# Patient Record
Sex: FEMALE | Race: WHITE | Employment: UNEMPLOYED | URBAN - METROPOLITAN AREA
[De-identification: names, ages, dates, MRNs, and addresses within clinical notes are randomized per-mention and may not be internally consistent; named-entity substitution may affect disease eponyms.]

---
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
## 2024-02-24 ENCOUNTER — APPOINTMENT (OUTPATIENT)
Dept: ULTRASOUND IMAGING | Age: 24
End: 2024-02-24
Payer: MEDICAID

## 2024-02-24 ENCOUNTER — HOSPITAL ENCOUNTER (EMERGENCY)
Age: 24
Discharge: HOME | End: 2024-02-24
Attending: EMERGENCY MEDICINE
Payer: MEDICAID

## 2024-02-24 ENCOUNTER — APPOINTMENT (OUTPATIENT)
Dept: GENERAL RADIOLOGY | Age: 24
End: 2024-02-24
Payer: MEDICAID

## 2024-02-24 VITALS
HEIGHT: 63 IN | DIASTOLIC BLOOD PRESSURE: 60 MMHG | WEIGHT: 148.59 LBS | BODY MASS INDEX: 26.33 KG/M2 | HEART RATE: 82 BPM | OXYGEN SATURATION: 98 % | SYSTOLIC BLOOD PRESSURE: 96 MMHG | TEMPERATURE: 98.4 F | RESPIRATION RATE: 20 BRPM

## 2024-02-24 DIAGNOSIS — B96.89 BACTERIAL VAGINOSIS IN PREGNANCY: ICD-10-CM

## 2024-02-24 DIAGNOSIS — O36.80X0 PREGNANCY OF UNKNOWN ANATOMIC LOCATION: Primary | ICD-10-CM

## 2024-02-24 DIAGNOSIS — O23.599 BACTERIAL VAGINOSIS IN PREGNANCY: ICD-10-CM

## 2024-02-24 DIAGNOSIS — R07.89 ATYPICAL CHEST PAIN: Primary | ICD-10-CM

## 2024-02-24 DIAGNOSIS — Z3A.01 LESS THAN 8 WEEKS GESTATION OF PREGNANCY: ICD-10-CM

## 2024-02-24 LAB
ALBUMIN SERPL-MCNC: 4.5 G/DL (ref 3.5–5.2)
ALBUMIN/GLOB SERPL: 1.7 {RATIO} (ref 1–2.5)
ALP SERPL-CCNC: 59 U/L (ref 35–104)
ALT SERPL-CCNC: 11 U/L (ref 5–33)
ANION GAP SERPL CALCULATED.3IONS-SCNC: 14 MMOL/L (ref 9–17)
AST SERPL-CCNC: 14 U/L
B-HCG SERPL EIA 3RD IS-ACNC: 179.6 MIU/ML
BACTERIA URNS QL MICRO: ABNORMAL
BASOPHILS # BLD: <0.03 K/UL (ref 0–0.2)
BASOPHILS NFR BLD: 0 % (ref 0–2)
BILIRUB SERPL-MCNC: 0.8 MG/DL (ref 0.3–1.2)
BILIRUB UR QL STRIP: NEGATIVE
BUN SERPL-MCNC: 15 MG/DL (ref 6–20)
C TRACH DNA SPEC QL PROBE+SIG AMP: NORMAL
CALCIUM SERPL-MCNC: 8.9 MG/DL (ref 8.6–10.4)
CANDIDA SPECIES: NEGATIVE
CASTS #/AREA URNS LPF: ABNORMAL /LPF (ref 0–8)
CHLORIDE SERPL-SCNC: 103 MMOL/L (ref 98–107)
CLARITY UR: ABNORMAL
CO2 SERPL-SCNC: 19 MMOL/L (ref 20–31)
COLOR UR: YELLOW
CREAT SERPL-MCNC: 0.5 MG/DL (ref 0.5–0.9)
EOSINOPHIL # BLD: 0.04 K/UL (ref 0–0.44)
EOSINOPHILS RELATIVE PERCENT: 1 % (ref 1–4)
EPI CELLS #/AREA URNS HPF: ABNORMAL /HPF (ref 0–5)
ERYTHROCYTE [DISTWIDTH] IN BLOOD BY AUTOMATED COUNT: 13.1 % (ref 11.8–14.4)
GARDNERELLA VAGINALIS: POSITIVE
GFR SERPL CREATININE-BSD FRML MDRD: >60 ML/MIN/1.73M2
GLUCOSE SERPL-MCNC: 103 MG/DL (ref 70–99)
GLUCOSE UR STRIP-MCNC: NEGATIVE MG/DL
HCT VFR BLD AUTO: 37.5 % (ref 36.3–47.1)
HGB BLD-MCNC: 12.5 G/DL (ref 11.9–15.1)
HGB UR QL STRIP.AUTO: NEGATIVE
IMM GRANULOCYTES # BLD AUTO: 0.03 K/UL (ref 0–0.3)
IMM GRANULOCYTES NFR BLD: 0 %
KETONES UR STRIP-MCNC: ABNORMAL MG/DL
LEUKOCYTE ESTERASE UR QL STRIP: NEGATIVE
LYMPHOCYTES NFR BLD: 1.84 K/UL (ref 1.1–3.7)
LYMPHOCYTES RELATIVE PERCENT: 23 % (ref 24–43)
MCH RBC QN AUTO: 29.8 PG (ref 25.2–33.5)
MCHC RBC AUTO-ENTMCNC: 33.3 G/DL (ref 28.4–34.8)
MCV RBC AUTO: 89.5 FL (ref 82.6–102.9)
MONOCYTES NFR BLD: 0.61 K/UL (ref 0.1–1.2)
MONOCYTES NFR BLD: 8 % (ref 3–12)
N GONORRHOEA DNA SPEC QL PROBE+SIG AMP: NORMAL
NEUTROPHILS NFR BLD: 68 % (ref 36–65)
NEUTS SEG NFR BLD: 5.56 K/UL (ref 1.5–8.1)
NITRITE UR QL STRIP: NEGATIVE
NRBC BLD-RTO: 0 PER 100 WBC
PH UR STRIP: 6 [PH] (ref 5–8)
PLATELET # BLD AUTO: 242 K/UL (ref 138–453)
PMV BLD AUTO: 11.8 FL (ref 8.1–13.5)
POTASSIUM SERPL-SCNC: 3.6 MMOL/L (ref 3.7–5.3)
PROT SERPL-MCNC: 7.1 G/DL (ref 6.4–8.3)
PROT UR STRIP-MCNC: NEGATIVE MG/DL
RBC # BLD AUTO: 4.19 M/UL (ref 3.95–5.11)
RBC #/AREA URNS HPF: ABNORMAL /HPF (ref 0–4)
SODIUM SERPL-SCNC: 136 MMOL/L (ref 135–144)
SOURCE: ABNORMAL
SP GR UR STRIP: 1.03 (ref 1–1.03)
SPECIMEN DESCRIPTION: NORMAL
TRICHOMONAS: NEGATIVE
TROPONIN I SERPL HS-MCNC: <6 NG/L (ref 0–14)
UROBILINOGEN UR STRIP-ACNC: NORMAL EU/DL (ref 0–1)
WBC #/AREA URNS HPF: ABNORMAL /HPF (ref 0–5)
WBC OTHER # BLD: 8.1 K/UL (ref 3.5–11.3)

## 2024-02-24 PROCEDURE — 76817 TRANSVAGINAL US OBSTETRIC: CPT

## 2024-02-24 PROCEDURE — 87480 CANDIDA DNA DIR PROBE: CPT

## 2024-02-24 PROCEDURE — 80053 COMPREHEN METABOLIC PANEL: CPT

## 2024-02-24 PROCEDURE — 87660 TRICHOMONAS VAGIN DIR PROBE: CPT

## 2024-02-24 PROCEDURE — 81001 URINALYSIS AUTO W/SCOPE: CPT

## 2024-02-24 PROCEDURE — 87510 GARDNER VAG DNA DIR PROBE: CPT

## 2024-02-24 PROCEDURE — 93005 ELECTROCARDIOGRAM TRACING: CPT

## 2024-02-24 PROCEDURE — 84484 ASSAY OF TROPONIN QUANT: CPT

## 2024-02-24 PROCEDURE — 84702 CHORIONIC GONADOTROPIN TEST: CPT

## 2024-02-24 PROCEDURE — 87591 N.GONORRHOEAE DNA AMP PROB: CPT

## 2024-02-24 PROCEDURE — 6370000000 HC RX 637 (ALT 250 FOR IP)

## 2024-02-24 PROCEDURE — 85025 COMPLETE CBC W/AUTO DIFF WBC: CPT

## 2024-02-24 PROCEDURE — 71045 X-RAY EXAM CHEST 1 VIEW: CPT

## 2024-02-24 PROCEDURE — 87491 CHLMYD TRACH DNA AMP PROBE: CPT

## 2024-02-24 PROCEDURE — 87086 URINE CULTURE/COLONY COUNT: CPT

## 2024-02-24 PROCEDURE — 99285 EMERGENCY DEPT VISIT HI MDM: CPT

## 2024-02-24 RX ORDER — METRONIDAZOLE 7.5 MG/G
1 GEL VAGINAL DAILY
Qty: 70 G | Refills: 0 | Status: SHIPPED | OUTPATIENT
Start: 2024-02-24 | End: 2024-02-29

## 2024-02-24 RX ORDER — CEPHALEXIN 500 MG/1
500 CAPSULE ORAL 4 TIMES DAILY
Qty: 28 CAPSULE | Refills: 0 | Status: SHIPPED | OUTPATIENT
Start: 2024-02-24 | End: 2024-03-02

## 2024-02-24 RX ORDER — CEPHALEXIN 500 MG/1
500 CAPSULE ORAL ONCE
Status: COMPLETED | OUTPATIENT
Start: 2024-02-24 | End: 2024-02-24

## 2024-02-24 RX ADMIN — CEPHALEXIN 500 MG: 500 CAPSULE ORAL at 22:30

## 2024-02-24 ASSESSMENT — ENCOUNTER SYMPTOMS
COUGH: 0
NAUSEA: 0
VOMITING: 0
ABDOMINAL PAIN: 1
SHORTNESS OF BREATH: 0

## 2024-02-24 ASSESSMENT — PAIN DESCRIPTION - LOCATION: LOCATION: ABDOMEN

## 2024-02-24 ASSESSMENT — PAIN - FUNCTIONAL ASSESSMENT: PAIN_FUNCTIONAL_ASSESSMENT: 0-10

## 2024-02-24 ASSESSMENT — PAIN SCALES - GENERAL: PAINLEVEL_OUTOF10: 6

## 2024-02-24 ASSESSMENT — HEART SCORE: ECG: 0

## 2024-02-25 LAB
MICROORGANISM SPEC CULT: NORMAL
SPECIMEN DESCRIPTION: NORMAL

## 2024-02-25 NOTE — ED NOTES
Pt arrives to ED 44 via triage.  Pt co abdominal cramping and sporadic chest pain.  Pt states that she took two pregnancy tests and they were both positive.  Pt states that she has chest pain randomly and has noticed her heart rate in the 150s.   Pt states that both maternal and fraternal sides of her family both have cardiac hx.  Pt denies taking anything for pain.  Pt states that she has been seen by a doctor for this, but they have not found anything previously.  Pt states that her LMP was at the end of January.   Pt states that she has had two previous pregnancies and had to have an emergency  due to hemorrhaging.  Pt states that she does have a hx of gestational diabetes.   Pt respirations are even and unlabored, pt is alert and oriented X 4, speaking in complete sentences, bed is in the lowest position, call light is within reach, NAD noted.   Will continue to follow plan of care.

## 2024-02-25 NOTE — ED NOTES
Labeled blood specimens sent to lab via tube system.    [x] Lavender   [] on ice   [x] Blue   [x] Green/yellow  [x] Green/black [] on ice  [] Pink  [] Red  [] Yellow  [] on ice  [] Blood Cultures  [] x1 [] x2

## 2024-02-25 NOTE — CONSULTS
0.0 - 1.0 EU/dL    Nitrite, Urine NEGATIVE NEGATIVE    Leukocyte Esterase, Urine NEGATIVE NEGATIVE    WBC, UA 0 TO 2 0 - 5 /HPF    RBC, UA 2 TO 5 0 - 4 /HPF    Casts UA  0 - 8 /LPF     2 TO 5 HYALINE Reference range defined for non-centrifuged specimen.    Epithelial Cells UA 5 TO 10 0 - 5 /HPF    Bacteria, UA FEW (A) None         DIAGNOSTICS:    US OB TRANSVAGINAL    Result Date: 2024  EXAMINATION: FIRST TRIMESTER OBSTETRIC ULTRASOUND 2024 TECHNIQUE: first trimester obstetric pelvic duplex ultrasound was performed with real-time imaging, color flow Doppler imaging, and spectral analysis. COMPARISON: None HISTORY: ORDERING SYSTEM PROVIDED HISTORY: lower abd cramping 4 weeks gestation by LMP TECHNOLOGIST PROVIDED HISTORY: lower abd cramping 4 weeks gestation by LMP FINDINGS: Uterus: 8.8 x 5.6 x 4.3 cm  scar noted anteriorly.  Small amount of fluid within the lower uterine segment and cervix.  Small echogenic focus within the endometrium measuring 2 mm, nonspecific and possible calcification.  Otherwise no focal abnormality identified. Cervical length: Not measured Gestational Sac(s):  Not visualized Yolk Sac:  Not visualized Fetal Pole:  Not visualized Crown Rump Length:  Not visualized Fetal Heart Rate:  Not applicable Right ovary: The right ovary measures 3.0 x 2.4 x 1.6 cm.  Arterial and venous flow identified.  No focal abnormality identified. Left ovary: The left ovary measures 5.6 x 3.3 x 3.6 cm.  Normal vascularity to the left ovary.  2.7 x 2.1 x 1.7 cm anechoic cyst at the left ovary with some internal debris favoring a hemorrhagic cyst.  Smaller follicle or simple cyst adjacent.  Attention on imaging follow-up to exclude complex septated cyst. Free fluid: Small amount of free fluid in the left pelvis and posterior cul-de-sac. Measurements: Estimated gestational age by current ultrasound: Not applicable Estimated gestational age by LMP/prior ultrasound: 4 weeks, 3 days Estimated Due Date:

## 2024-02-25 NOTE — DISCHARGE INSTRUCTIONS
Seen in the emergency department for abdominal pain, you are found to be pregnant.  We did a transvaginal ultrasound which did not show a definitive pregnancy, but did not show an ectopic pregnancy.  It is most likely that your very early on in your pregnancy.  We spoke to our OB/GYN team who states that you will need to be seen in 48 hours in order to get repeat blood work done.  This is very important that she do call your OB/GYN, or return to the emergency department to get this blood work done.    Please return to the emergency department with any new or worsening symptoms including increasing or abdominal pain, fever, excessive nausea vomiting, shortness of breath, chest pain.  Please return to the emergency department for any syncope or dizziness or any other concerning symptoms.

## 2024-02-25 NOTE — ED NOTES
Labeled urine specimen sent to lab via tube system.    Urine Sample  [x]  Clean catch  []  Straight cath  []  Urine voided  []  Indwelling catheter  []  Suprapubic catheter

## 2024-02-25 NOTE — ED PROVIDER NOTES
[facility] ED     Emergency Department     Faculty Attestation        I performed a history and physical examination of the patient and discussed management with the resident. I reviewed the resident’s note and agree with the documented findings and plan of care. Any areas of disagreement are noted on the chart. I was personally present for the key portions of any procedures. I have documented in the chart those procedures where I was not present during the key portions. I have reviewed the emergency nurses triage note. I agree with the chief complaint, past medical history, past surgical history, allergies, medications, social and family history as documented unless otherwise noted below.    For mid-level providers such as nurse practitioners as well as physicians assistants:    I have personally seen and evaluated the patient.    I find the patient's history and physical exam are consistent with NP/PA documentation.  I agree with the care provided, treatment rendered, disposition, & follow-up plan.     Additional findings are as noted.    Vital Signs: /72   Pulse 89   Temp 98.4 °F (36.9 °C) (Oral)   Resp 18   Ht 1.6 m (5' 3\")   Wt 67.4 kg (148 lb 9.4 oz)   SpO2 98%   BMI 26.32 kg/m²   PCP:  Veronique Townsend MD    Pertinent Comments:     2 complaints the first is left lower quadrant abdominal pain she recently had a positive pregnancy is unsure of her last period but complains of lower pelvic pain.  No vaginal bleeding vaginal discharge hematuria dysuria.  Her second complaint is intermittent chest pain she will have episodes ever since the summer 2023 where she will get racing feeling in her heart and chest pain.  She states this happens randomly is not necessarily associated with exertion.  No cough shortness of breath nausea vomiting or diarrhea.  Will check EKG cardiac labs quantitative hCG transvaginal ultrasound rule out ectopic, pain 
     [facility] ED  Emergency Department  Emergency Medicine Resident Turn-Over     Note Started: 9:53 PM EST    Care of Selin Carlin was assumed from Dr. Page and is being seen for Chest Pain and Abdominal Pain (Positive pregnancy test)  .  The patient's initial evaluation and plan have been discussed with the prior provider who initially evaluated the patient.     EMERGENCY DEPARTMENT COURSE / MEDICAL DECISION MAKING:       MEDICATIONS GIVEN:  Orders Placed This Encounter   Medications    cephALEXin (KEFLEX) 500 MG capsule     Sig: Take 1 capsule by mouth 4 times daily for 7 days     Dispense:  28 capsule     Refill:  0    metroNIDAZOLE (METROGEL) 0.75 % vaginal gel     Sig: Place 1 Applicatorful vaginally daily for 5 days     Dispense:  70 g     Refill:  0    cephALEXin (KEFLEX) capsule 500 mg     Order Specific Question:   Antimicrobial Indications     Answer:   Urinary Tract Infection       LABS / RADIOLOGY:     Labs Reviewed   VAGINITIS DNA PROBE - Abnormal; Notable for the following components:       Result Value    GARDNERELLA VAGINALIS POSITIVE (*)     All other components within normal limits   CBC WITH AUTO DIFFERENTIAL - Abnormal; Notable for the following components:    Neutrophils % 68 (*)     Lymphocytes % 23 (*)     All other components within normal limits   COMPREHENSIVE METABOLIC PANEL - Abnormal; Notable for the following components:    Potassium 3.6 (*)     CO2 19 (*)     Glucose 103 (*)     All other components within normal limits   HCG, QUANTITATIVE, PREGNANCY - Abnormal; Notable for the following components:    hCG Quant 179.6 (*)     All other components within normal limits   URINALYSIS WITH MICROSCOPIC - Abnormal; Notable for the following components:    Turbidity UA Cloudy (*)     Ketones, Urine SMALL (*)     Specific Gravity, UA 1.032 (*)     Bacteria, UA FEW (*)     All other components within normal limits   C.TRACHOMATIS N.GONORRHOEAE DNA   CULTURE, URINE 
[facility]  FACULTY HANDOFF     10:34 PM EST  Handoff taken on the following patient from prior Attending Physician:  Pt Name: Selin S Tesfaye  PCP:  Veronique Townsend MD    Attestation  I was available and discussed any additional care issues that arose and coordinated the management plans with the resident(s) caring for the patient during my duty period. Any areas of disagreement with resident's documentation of care or procedures are noted on the chart. I was personally present for the key portions of any/all procedures during my duty period. I have documented in the chart those procedures where I was not present during the key portions.         CHIEF COMPLAINT       Chief Complaint   Patient presents with    Chest Pain    Abdominal Pain     Positive pregnancy test         CURRENT MEDICATIONS     Previous Medications  Previous Medications    ALBUTEROL (PROVENTIL) (2.5 MG/3ML) 0.083% NEBULIZER SOLUTION    Take 3 mLs by nebulization every 6 hours as needed for Wheezing    ALBUTEROL IN    Inhale  into the lungs as needed.    DEXTROMETHORPHAN-GUAIFENESIN (MUCINEX DM MAXIMUM STRENGTH)  MG TB12    Take 1 tablet by mouth 2 times daily    HYDROCODONE-ACETAMINOPHEN (NORCO) 5-325 MG PER TABLET    Take 1 tablet by mouth every 4 hours as needed (as needed for cough, fever and discomfort) .    LORATADINE (CLARITIN) 10 MG TABLET    Take 1 tablet by mouth daily    MOMETASONE-FORMOTEROL (DULERA) 200-5 MCG/ACT INHALER    Inhale 2 puffs into the lungs every 12 hours    MONTELUKAST (SINGULAIR) 10 MG TABLET    Take 1 tablet by mouth nightly       Encounter Medications  Orders Placed This Encounter   Medications    cephALEXin (KEFLEX) 500 MG capsule     Sig: Take 1 capsule by mouth 4 times daily for 7 days     Dispense:  28 capsule     Refill:  0    metroNIDAZOLE (METROGEL) 0.75 % vaginal gel     Sig: Place 1 Applicatorful vaginally daily for 5 days     Dispense:  70 g     Refill:  0    cephALEXin (KEFLEX) 
abdominal pain. Negative for nausea and vomiting.   Neurological:  Negative for headaches.       PHYSICAL EXAM      INITIAL VITALS:   BP 96/60   Pulse 82   Temp 98.4 °F (36.9 °C) (Oral)   Resp 20   Ht 1.6 m (5' 3\")   Wt 67.4 kg (148 lb 9.4 oz)   SpO2 98%   BMI 26.32 kg/m²     Physical Exam  Constitutional:       General: She is not in acute distress.     Appearance: She is not toxic-appearing.   HENT:      Head: Normocephalic.      Nose: No rhinorrhea.      Mouth/Throat:      Mouth: Mucous membranes are moist.   Eyes:      Conjunctiva/sclera: Conjunctivae normal.      Pupils: Pupils are equal, round, and reactive to light.   Cardiovascular:      Rate and Rhythm: Normal rate and regular rhythm.      Pulses: Normal pulses.   Pulmonary:      Effort: Pulmonary effort is normal. No respiratory distress.      Breath sounds: Normal breath sounds. No wheezing.   Abdominal:      General: Bowel sounds are normal. There is no distension.      Palpations: Abdomen is soft.      Tenderness: There is abdominal tenderness in the right lower quadrant, suprapubic area and left lower quadrant. There is no rebound.   Genitourinary:     Comments: Chaperoned by Rere FONSECA    Labia is without rash or lesion  Vaginal vault with mild amount of white discharge  Cervical os is closed without bleeding or friability  Cervical motion tenderness noted as well as left adnexal tenderness.  No tenderness to palpation in the right adnexa.    Musculoskeletal:      Right lower leg: No edema.      Left lower leg: No edema.   Skin:     General: Skin is warm and dry.   Neurological:      Mental Status: She is alert and oriented to person, place, and time.   Psychiatric:         Mood and Affect: Mood normal.         Behavior: Behavior normal.         Judgment: Judgment normal.           DDX/DIAGNOSTIC RESULTS / EMERGENCY DEPARTMENT COURSE / MDM     Medical Decision Making  23-year-old female who presents with complaints of both chest pain and

## 2024-02-26 ENCOUNTER — HOSPITAL ENCOUNTER (OUTPATIENT)
Age: 24
Discharge: HOME | End: 2024-02-26
Payer: MEDICAID

## 2024-02-26 DIAGNOSIS — O36.80X0 PREGNANCY OF UNKNOWN ANATOMIC LOCATION: ICD-10-CM

## 2024-02-26 LAB
B-HCG SERPL EIA 3RD IS-ACNC: 344.9 MIU/ML
C TRACH DNA SPEC QL PROBE+SIG AMP: NEGATIVE
N GONORRHOEA DNA SPEC QL PROBE+SIG AMP: NEGATIVE
SPECIMEN DESCRIPTION: NORMAL

## 2024-02-26 PROCEDURE — 84702 CHORIONIC GONADOTROPIN TEST: CPT

## 2024-02-26 PROCEDURE — 36415 COLL VENOUS BLD VENIPUNCTURE: CPT

## 2024-02-28 ENCOUNTER — TELEPHONE (OUTPATIENT)
Dept: OBGYN | Age: 24
End: 2024-02-28

## 2024-02-28 LAB
EKG ATRIAL RATE: 81 BPM
EKG P AXIS: 42 DEGREES
EKG P-R INTERVAL: 182 MS
EKG Q-T INTERVAL: 376 MS
EKG QRS DURATION: 82 MS
EKG QTC CALCULATION (BAZETT): 436 MS
EKG R AXIS: 48 DEGREES
EKG T AXIS: 35 DEGREES
EKG VENTRICULAR RATE: 81 BPM

## 2024-02-28 NOTE — TELEPHONE ENCOUNTER
----- Message from Bree Orosco DO sent at 2/28/2024  8:32 AM EST -----  Hi,       This patient was seen in the ED 2/24/24 for pregnancy of unknown location. Can we please schedule her a follow up [facility] appointment?       Thank you!  Bree

## 2024-02-29 ENCOUNTER — TELEPHONE (OUTPATIENT)
Dept: OBGYN | Age: 24
End: 2024-02-29

## 2024-02-29 NOTE — TELEPHONE ENCOUNTER
Obstetric/Gynecology Resident Telephone Note    Patient was evaluated in emergency department for pregnancy of unknown location. B-hCG on 2/24/24 179.6, 48 hr B-hCG 344.9. hCG rising approprietly. Attempted to call patient to discuss results as she does not yet have a follow up appointment at [facility]. However, telephone call goes straight to .     Low suspicion for ectopic pregnancy at this time, but will need to discuss with patient to ensure she is asymptomatic and hCG continues to rise appropriately.     Bree Orosco DO  OB/GYN Resident, PGY2  [place], [place]  2/29/2024, 8:14 AM